# Patient Record
Sex: MALE | Race: ASIAN | NOT HISPANIC OR LATINO | URBAN - METROPOLITAN AREA
[De-identification: names, ages, dates, MRNs, and addresses within clinical notes are randomized per-mention and may not be internally consistent; named-entity substitution may affect disease eponyms.]

---

## 2018-11-25 ENCOUNTER — INPATIENT (INPATIENT)
Facility: HOSPITAL | Age: 33
LOS: 11 days | Discharge: HOME | End: 2018-12-07
Attending: PSYCHIATRY & NEUROLOGY | Admitting: PSYCHIATRY & NEUROLOGY
Payer: COMMERCIAL

## 2018-11-25 ENCOUNTER — EMERGENCY (EMERGENCY)
Facility: HOSPITAL | Age: 33
LOS: 1 days | Discharge: PSYCHIATRIC OR SUBSTANCE USE FACILITY | End: 2018-11-25
Attending: EMERGENCY MEDICINE
Payer: COMMERCIAL

## 2018-11-25 VITALS
OXYGEN SATURATION: 98 % | RESPIRATION RATE: 18 BRPM | TEMPERATURE: 98 F | DIASTOLIC BLOOD PRESSURE: 81 MMHG | HEART RATE: 130 BPM | SYSTOLIC BLOOD PRESSURE: 119 MMHG | WEIGHT: 160.06 LBS

## 2018-11-25 VITALS
SYSTOLIC BLOOD PRESSURE: 133 MMHG | HEART RATE: 101 BPM | RESPIRATION RATE: 16 BRPM | DIASTOLIC BLOOD PRESSURE: 87 MMHG | TEMPERATURE: 99 F | OXYGEN SATURATION: 98 %

## 2018-11-25 DIAGNOSIS — R69 ILLNESS, UNSPECIFIED: ICD-10-CM

## 2018-11-25 DIAGNOSIS — F20.9 SCHIZOPHRENIA, UNSPECIFIED: ICD-10-CM

## 2018-11-25 LAB
ALBUMIN SERPL ELPH-MCNC: 4.9 G/DL — SIGNIFICANT CHANGE UP (ref 3.3–5)
ALP SERPL-CCNC: 60 U/L — SIGNIFICANT CHANGE UP (ref 40–120)
ALT FLD-CCNC: 12 U/L — SIGNIFICANT CHANGE UP (ref 10–45)
ANION GAP SERPL CALC-SCNC: 17 MMOL/L — SIGNIFICANT CHANGE UP (ref 5–17)
APAP SERPL-MCNC: <15 UG/ML — SIGNIFICANT CHANGE UP (ref 10–30)
AST SERPL-CCNC: 17 U/L — SIGNIFICANT CHANGE UP (ref 10–40)
BASOPHILS # BLD AUTO: 0.1 K/UL — SIGNIFICANT CHANGE UP (ref 0–0.2)
BASOPHILS NFR BLD AUTO: 1.3 % — SIGNIFICANT CHANGE UP (ref 0–2)
BILIRUB SERPL-MCNC: 0.5 MG/DL — SIGNIFICANT CHANGE UP (ref 0.2–1.2)
BUN SERPL-MCNC: 10 MG/DL — SIGNIFICANT CHANGE UP (ref 7–23)
CALCIUM SERPL-MCNC: 9.1 MG/DL — SIGNIFICANT CHANGE UP (ref 8.4–10.5)
CHLORIDE SERPL-SCNC: 105 MMOL/L — SIGNIFICANT CHANGE UP (ref 96–108)
CO2 SERPL-SCNC: 20 MMOL/L — LOW (ref 22–31)
CREAT SERPL-MCNC: 0.82 MG/DL — SIGNIFICANT CHANGE UP (ref 0.5–1.3)
EOSINOPHIL # BLD AUTO: 0.4 K/UL — SIGNIFICANT CHANGE UP (ref 0–0.5)
EOSINOPHIL NFR BLD AUTO: 4.7 % — SIGNIFICANT CHANGE UP (ref 0–6)
ETHANOL SERPL-MCNC: 54 MG/DL — HIGH (ref 0–10)
GAS PNL BLDV: SIGNIFICANT CHANGE UP
GLUCOSE SERPL-MCNC: 115 MG/DL — HIGH (ref 70–99)
HCT VFR BLD CALC: 47.7 % — SIGNIFICANT CHANGE UP (ref 39–50)
HGB BLD-MCNC: 17.3 G/DL — HIGH (ref 13–17)
LYMPHOCYTES # BLD AUTO: 2.7 K/UL — SIGNIFICANT CHANGE UP (ref 1–3.3)
LYMPHOCYTES # BLD AUTO: 35 % — SIGNIFICANT CHANGE UP (ref 13–44)
MCHC RBC-ENTMCNC: 31.2 PG — SIGNIFICANT CHANGE UP (ref 27–34)
MCHC RBC-ENTMCNC: 36.3 GM/DL — HIGH (ref 32–36)
MCV RBC AUTO: 85.9 FL — SIGNIFICANT CHANGE UP (ref 80–100)
MONOCYTES # BLD AUTO: 0.6 K/UL — SIGNIFICANT CHANGE UP (ref 0–0.9)
MONOCYTES NFR BLD AUTO: 7.3 % — SIGNIFICANT CHANGE UP (ref 2–14)
NEUTROPHILS # BLD AUTO: 4 K/UL — SIGNIFICANT CHANGE UP (ref 1.8–7.4)
NEUTROPHILS NFR BLD AUTO: 51.7 % — SIGNIFICANT CHANGE UP (ref 43–77)
PLATELET # BLD AUTO: 273 K/UL — SIGNIFICANT CHANGE UP (ref 150–400)
POTASSIUM SERPL-MCNC: 4.3 MMOL/L — SIGNIFICANT CHANGE UP (ref 3.5–5.3)
POTASSIUM SERPL-SCNC: 4.3 MMOL/L — SIGNIFICANT CHANGE UP (ref 3.5–5.3)
PROT SERPL-MCNC: 7.9 G/DL — SIGNIFICANT CHANGE UP (ref 6–8.3)
RBC # BLD: 5.55 M/UL — SIGNIFICANT CHANGE UP (ref 4.2–5.8)
RBC # FLD: 11 % — SIGNIFICANT CHANGE UP (ref 10.3–14.5)
SALICYLATES SERPL-MCNC: <2 MG/DL — LOW (ref 15–30)
SODIUM SERPL-SCNC: 142 MMOL/L — SIGNIFICANT CHANGE UP (ref 135–145)
WBC # BLD: 7.6 K/UL — SIGNIFICANT CHANGE UP (ref 3.8–10.5)
WBC # FLD AUTO: 7.6 K/UL — SIGNIFICANT CHANGE UP (ref 3.8–10.5)

## 2018-11-25 PROCEDURE — 85027 COMPLETE CBC AUTOMATED: CPT

## 2018-11-25 PROCEDURE — 83605 ASSAY OF LACTIC ACID: CPT

## 2018-11-25 PROCEDURE — 99053 MED SERV 10PM-8AM 24 HR FAC: CPT

## 2018-11-25 PROCEDURE — 84132 ASSAY OF SERUM POTASSIUM: CPT

## 2018-11-25 PROCEDURE — 80307 DRUG TEST PRSMV CHEM ANLYZR: CPT

## 2018-11-25 PROCEDURE — 80053 COMPREHEN METABOLIC PANEL: CPT

## 2018-11-25 PROCEDURE — 90792 PSYCH DIAG EVAL W/MED SRVCS: CPT | Mod: GT

## 2018-11-25 PROCEDURE — 82330 ASSAY OF CALCIUM: CPT

## 2018-11-25 PROCEDURE — 99285 EMERGENCY DEPT VISIT HI MDM: CPT | Mod: 25

## 2018-11-25 PROCEDURE — 82435 ASSAY OF BLOOD CHLORIDE: CPT

## 2018-11-25 PROCEDURE — 93005 ELECTROCARDIOGRAM TRACING: CPT

## 2018-11-25 PROCEDURE — 93010 ELECTROCARDIOGRAM REPORT: CPT

## 2018-11-25 PROCEDURE — 84295 ASSAY OF SERUM SODIUM: CPT

## 2018-11-25 PROCEDURE — 70450 CT HEAD/BRAIN W/O DYE: CPT | Mod: 26

## 2018-11-25 PROCEDURE — 82803 BLOOD GASES ANY COMBINATION: CPT

## 2018-11-25 PROCEDURE — 82947 ASSAY GLUCOSE BLOOD QUANT: CPT

## 2018-11-25 PROCEDURE — 70450 CT HEAD/BRAIN W/O DYE: CPT

## 2018-11-25 PROCEDURE — 85014 HEMATOCRIT: CPT

## 2018-11-25 RX ORDER — THIAMINE MONONITRATE (VIT B1) 100 MG
100 TABLET ORAL DAILY
Qty: 0 | Refills: 0 | Status: COMPLETED | OUTPATIENT
Start: 2018-11-25 | End: 2018-11-28

## 2018-11-25 RX ORDER — FOLIC ACID 0.8 MG
1 TABLET ORAL DAILY
Qty: 0 | Refills: 0 | Status: COMPLETED | OUTPATIENT
Start: 2018-11-25 | End: 2018-12-02

## 2018-11-25 NOTE — ED BEHAVIORAL HEALTH ASSESSMENT NOTE - DIFFERENTIAL
unspecified schizophrenia spectrum and other psychotic disorder  r/o schizophrenia, schizoaffective disorder, bipolar disorder, MDD with psychosis, substance/K2 induced psychotic disorder

## 2018-11-25 NOTE — ED PROVIDER NOTE - OBJECTIVE STATEMENT
33 M no known medical problems, brought in by PD for threatening family. Pt moved to [place] from [place] recently, and states he was brought here because he has been in a fight with family and friends from [place] and today he made threatening comments to them. [place] PD called [place] and on arrival patient seemed confused. Patient providing mostly one word answers. Says no when asked if depressed or suicidal. No recent illnesses. Denies toxic ingestion. Denies auditory or visual hallucinations.

## 2018-11-25 NOTE — ED BEHAVIORAL HEALTH ASSESSMENT NOTE - SUBSTANCE ISSUES AND PLAN (INCLUDE STANDING AND PRN MEDICATION)
unclear substance use history, UTox negative with BAL 54, no indication to suspect alcohol withdrawal but should patient show signs including tremor, diaphoresis, nausea/vomiting, tachycardia of unclear origin, would start CIWA protocol

## 2018-11-25 NOTE — ED BEHAVIORAL HEALTH ASSESSMENT NOTE - SUMMARY
33M with unknown past psychiatric history BIB [place] activated by [place] after sister called them to report that patient had been calling her and threatening her.    On exam patient appears to be internally preoccupied, with thought blocking, increased speech latency, and disorganized thought process unable to provide a reliable narrative or explanation for the events leading to his ER presentation but endorsing ongoing homicide ideation towards his sister. Urine toxicology is negative though cannot rule out K2. BAL 52 not likely enough to account for his symptomatology, and so underlying psychotic disorder is most likely diagnosis at this point. Will need to obtain further collateral to clarify history but for now he will need inpatient hospitalization for safety and stabilization and meets [facility] criteria based on his being a threat to others.

## 2018-11-25 NOTE — ED PROVIDER NOTE - ATTENDING CONTRIBUTION TO CARE
Attending MD Durham:  I personally have seen and examined this patient.  Resident note reviewed and agree on plan of care and except where noted.  See HPI, PE, and MDM for details.      33M with unknown pmh presenting with report of threatening text messages to family, confusion. Patient on exam is very withdrawn but oriented, nonfocal neuro exam, vitals notable for tachycardia but no other exam findings suggestive of sympathomimetic toxidrome. Ddx includes primary psychiatric disturbance, intoxication. Do not suspect meningoencephalitis. Plan for labs, CT head, attempt to contact family to obtain collateral information regarding medical or psychiatric history

## 2018-11-25 NOTE — ED ADULT NURSE NOTE - OBJECTIVE STATEMENT
32 y/o m bib ems/police after he threatened his family in [place], [place] PD called [place] PD and went to his apt. brought to ED for AMS, on assessment, he responded in 1-2 words, (yes, no, not sure). pt. denies any s/hi, etoh/drug hx, not on any current treatment

## 2018-11-25 NOTE — ED BEHAVIORAL HEALTH ASSESSMENT NOTE - DESCRIPTION
Per chart review, ED staff:  Patient brought in via EMS at 4:03, telepsychiatry consulted at 5:11. Patient was minimally cooperative with ED treatment. Patient changed into hospital gown and provided blood sample without incident. Primary nurse reported patient is quiet and appears internally preoccupied. Patient continues to have poor eye contact in ED. Patient denied substance use but had a positive alcohol result. Primary nurse described patient as depressed with constricted affect; noting patient is evasive. When questioned about symptoms and reason for being in the ED patient responds with one- two word answers. Patient denies substance use. Patient denied history of past psych. At 4:59 patient was compliant with head CT and was transported without incident at Patient has been in good behavioral control in ED. Patient has not required medication or security intervention. Patient has not yet provided urine sample, stating that he does not have to go to the bathroom Denies, unable to accurately assess as patient is unreliable historian Reports that he was born and raised in [place], lived with biological parents, graduated HS in 4 years, went to college, graduated in 2009, worked at [employer] until 2013, unemployed since then but states that he is enrolled at [employer] in masters of public health

## 2018-11-25 NOTE — ED PROVIDER NOTE - PROGRESS NOTE DETAILS
Attending MD Durham: Shira Aly  (sister) Attending MD Durham: voicemail left for Shira Mcgovern seen by psych, recommend inpatient psych

## 2018-11-25 NOTE — ED PROVIDER NOTE - PHYSICAL EXAMINATION
Attending MD Durham: A & O x 2, withdrawn, rarely makes eye contact, calm, follows commands, EOMI b/l, PERRL b/l; lungs CTAB, heart tachy but reg rhythm without murmur; abdomen soft NTND; extremities warm with no edema; affect appropriate. neuro exam non focal with no gross motor or sensory deficits. skin dry

## 2018-11-25 NOTE — ED BEHAVIORAL HEALTH ASSESSMENT NOTE - HPI (INCLUDE ILLNESS QUALITY, SEVERITY, DURATION, TIMING, CONTEXT, MODIFYING FACTORS, ASSOCIATED SIGNS AND SYMPTOMS)
*history limited by patient mental status as he was internally preoccupied and often answered questions with "I don't know" or "maybe"    33M, new to our system, with unknown past psychiatric and medical history now brought in by [facility] called by [place] pd after sister called them and reported that he was threatening them. Sister ([phone]) and parents ([phone] or [phone]) did not answer calls and messages were left requesting that they call back.     Patient states that he moved from [place] where he grew up to [facility] to an apartment that he lives in by himself, supporting himself through savings he made while working for [employer], though he has not worked since 2013. He states that since that time, he spends his days in his apartment, sometimes reading, unable to state what he does every day, though he reports sleeping from 1am to 11am every night. He at first stated that he was in the hospital because his sister was harassing him by calling him names. Later he acknowledged that he has had thoughts of hurting her and that he threatened her, saying that he was half joking but half wanted to. He said he would not do anything to her because she was in [place] and he was here, but that he would not do anything to her if she were here because he felt in control now. He states that he wants to hurt her and others from college because they make fun of him and harass him though he did not clarify. Denies  having any firearms, denies auditory or visual hallucinations, denies that anyone is following him or out to get him. Denies having depression but stated "I don't know" when asked if he had been diagnosed with bipolar, schizophrenia, schizoaffective disorder. He denied taking any medications currently, denied taking supplements, denied using drugs but states he smokes cigarettes sometimes and that he had 3-4 beers on thanksgiving. Inconsistent history with regards to thanksgiving event as he first referenced that he was having trouble with relatives staying with him then later denied that he had any relatives staying with him.

## 2018-11-25 NOTE — ED ADULT NURSE NOTE - NSIMPLEMENTINTERV_GEN_ALL_ED
Implemented All Universal Safety Interventions:  [place] to call system. Call bell, personal items and telephone within reach. Instruct patient to call for assistance. Room bathroom lighting operational. Non-slip footwear when patient is off stretcher. Physically safe environment: no spills, clutter or unnecessary equipment. Stretcher in lowest position, wheels locked, appropriate side rails in place.

## 2018-11-25 NOTE — ED BEHAVIORAL HEALTH ASSESSMENT NOTE - OTHER PAST PSYCHIATRIC HISTORY (INCLUDE DETAILS REGARDING ONSET, COURSE OF ILLNESS, INPATIENT/OUTPATIENT TREATMENT)
States that he previously saw a psychiatrist in order to stay in school but was not able to clarify and stated "I don't know" in response to questions about what medication he has taken in the past and what his diagnosis is

## 2018-11-25 NOTE — ED ADULT NURSE NOTE - HPI (INCLUDE ILLNESS QUALITY, SEVERITY, DURATION, TIMING, CONTEXT, MODIFYING FACTORS, ASSOCIATED SIGNS AND SYMPTOMS)
34 y/o male bib ems/police after he threatened his family in [place]. on assessment, pt. is quiet, appears internally preoccupied, but cooperative w/ care and procedure. pt. responded in 1 or 2 words only. has poor eye contact, mood suspicious, affect constricted. pt. denies any etoh/drug abuse, not an any psych or ant-depressants.

## 2018-11-25 NOTE — ED BEHAVIORAL HEALTH ASSESSMENT NOTE - PSYCHIATRIC ISSUES AND PLAN (INCLUDE STANDING AND PRN MEDICATION)
obtain collateral from sister and parents. Patient has remained calm and in good behavioral control thus far. Would recommend starting risperdal 1mg PO BID for now until collateral obtained. For agitation, haldol 5mg with ativan 2mg and benadryl 50mg PO or IM if acute

## 2018-11-25 NOTE — ED PROVIDER NOTE - MEDICAL DECISION MAKING DETAILS
33 M brought in by PD for threatening behavior, and confusion. Patient aox3, with no neuro deficits, but giving one word answers, flat affect and avoiding eye contact. Tachycardic but afebrile. Trace monitor vitals, psych labs, ct head, psych consult

## 2018-11-25 NOTE — ED BEHAVIORAL HEALTH ASSESSMENT NOTE - RISK ASSESSMENT
risk factors include current homicide ideation, current psychosis, alcohol use. Unable to obtain an accurate history that would be needed to make a more accurate risk assessment but as of now he lacks protective factors.

## 2018-11-25 NOTE — ED BEHAVIORAL HEALTH ASSESSMENT NOTE - DESCRIPTION (FIRST USE, LAST USE, QUANTITY, FREQUENCY, DURATION)
unknown use frequency/quantity unclear duration and quantity. States that he drinks sometimes, said 3-4 beers on anna, BAL 54 at 4:49am

## 2018-11-25 NOTE — ED BEHAVIORAL HEALTH ASSESSMENT NOTE - ADDITIONAL DETAILS / COMMENTS
No insight into his illness, answers "I don't know" to many basic historical questions, wanted discharge to go home

## 2018-11-25 NOTE — ED BEHAVIORAL HEALTH ASSESSMENT NOTE - OTHER
unreliable historian, appears internally preoccupied unreliable historian, unable to accurately assess family in [place] called police answered questions with brief answers, often looked down or around the room did not assess gait/station flat denied but appeared internally preoccupied unable to assess, claims to be unemployed

## 2018-11-26 VITALS — TEMPERATURE: 98 F

## 2018-11-26 LAB
CHOLEST SERPL-MCNC: 158 MG/DL — SIGNIFICANT CHANGE UP (ref 120–199)
HBA1C BLD-MCNC: 4.8 % — SIGNIFICANT CHANGE UP (ref 4–5.6)
HDLC SERPL-MCNC: 67 MG/DL — HIGH (ref 35–55)
LIPID PNL WITH DIRECT LDL SERPL: 87 MG/DL — SIGNIFICANT CHANGE UP
TRIGL SERPL-MCNC: 101 MG/DL — SIGNIFICANT CHANGE UP (ref 10–149)
TSH SERPL-MCNC: 0.33 UIU/ML — SIGNIFICANT CHANGE UP (ref 0.27–4.2)

## 2018-11-26 PROCEDURE — 99222 1ST HOSP IP/OBS MODERATE 55: CPT

## 2018-11-26 RX ORDER — RISPERIDONE 4 MG/1
0.5 TABLET ORAL AT BEDTIME
Qty: 0 | Refills: 0 | Status: DISCONTINUED | OUTPATIENT
Start: 2018-11-26 | End: 2018-11-27

## 2018-11-26 RX ADMIN — Medication 1 MILLIGRAM(S): at 09:54

## 2018-11-26 RX ADMIN — Medication 100 MILLIGRAM(S): at 09:54

## 2018-11-26 RX ADMIN — RISPERIDONE 0.5 MILLIGRAM(S): 4 TABLET ORAL at 20:39

## 2018-11-26 RX ADMIN — Medication 1 TABLET(S): at 09:54

## 2018-11-26 NOTE — CHART NOTE - NSCHARTNOTEFT_GEN_A_CORE
Screening Medical Evaluation  Patient Admitted from: [facility] ED    [facility] admitting diagnosis: Schizophrenia    PAST MEDICAL & SURGICAL HISTORY:        Allergies    No Known Allergies    Intolerances        Social History:     FAMILY HISTORY:      MEDICATIONS  (STANDING):  folic acid 1 milliGRAM(s) Oral daily  multivitamin 1 Tablet(s) Oral daily  thiamine 100 milliGRAM(s) Oral daily    MEDICATIONS  (PRN):  LORazepam     Tablet 2 milliGRAM(s) Oral every 6 hours PRN Agitation  LORazepam     Tablet 2 milliGRAM(s) Oral every 2 hours PRN CIWA score increase by 2 points and current CIWA score GREATER THAN 9  LORazepam   Injectable 2 milliGRAM(s) IntraMuscular every 2 hours PRN CIWA score increase by 2 points and current CIWA score GREATER THAN 9  LORazepam   Injectable 2 milliGRAM(s) IntraMuscular once PRN Agitation      Vital Signs Last 24 Hrs  T(C): --  T(F): --  HR: --  BP: --  BP(mean): --  RR: --  SpO2: --  CAPILLARY BLOOD GLUCOSE            PHYSICAL EXAM:  GENERAL: NAD, well-developed  HEAD:  Atraumatic, Normocephalic  EYES: EOMI, PERRLA, conjunctiva and sclera clear  NECK: Supple.  CHEST/LUNG: Clear to auscultation bilaterally; No wheeze  HEART: Regular rate and rhythm; +s1 and +s2; No murmurs, rubs, or gallops  ABDOMEN: Soft, Nontender, Nondistended; Normoactive Bowel sounds present  EXTREMITIES:  2+ Peripheral Pulses, No cyanosis, or edema  PSYCH: AAOx3  NEUROLOGY: non-focal  SKIN: No rashes or lesions    LABS:                    RADIOLOGY & ADDITIONAL TESTS:    Assessment and Plan:  33 year old male presenting today from [facility] ED to [facility] with admitting diagnosis of Schizophrenia with no pertinent PMH. Denies any medical concerns at this time. Denies any fever, chills, headache, chest pain, SOB, abdominal pain, N/V/D/C. CIWA as a precaution due to elevated BAL on presentiation to [facility]. EKG Normal sinus rhytm, CBC, CMP WNL. UA, TSH, lipid priofile, and HbgA1c to follow up in AM.  1) Schizophrenia: Follow care plan as per primary psych team.

## 2018-11-27 PROCEDURE — 99232 SBSQ HOSP IP/OBS MODERATE 35: CPT

## 2018-11-27 RX ORDER — RISPERIDONE 4 MG/1
1 TABLET ORAL AT BEDTIME
Qty: 0 | Refills: 0 | Status: DISCONTINUED | OUTPATIENT
Start: 2018-11-27 | End: 2018-11-29

## 2018-11-27 RX ADMIN — RISPERIDONE 1 MILLIGRAM(S): 4 TABLET ORAL at 21:15

## 2018-11-27 RX ADMIN — Medication 1 TABLET(S): at 09:06

## 2018-11-27 RX ADMIN — Medication 1 MILLIGRAM(S): at 09:06

## 2018-11-27 RX ADMIN — Medication 100 MILLIGRAM(S): at 09:06

## 2018-11-28 PROCEDURE — 99232 SBSQ HOSP IP/OBS MODERATE 35: CPT

## 2018-11-28 RX ADMIN — Medication 100 MILLIGRAM(S): at 08:03

## 2018-11-28 RX ADMIN — Medication 1 TABLET(S): at 08:03

## 2018-11-28 RX ADMIN — Medication 1 MILLIGRAM(S): at 08:03

## 2018-11-28 RX ADMIN — RISPERIDONE 1 MILLIGRAM(S): 4 TABLET ORAL at 22:07

## 2018-11-29 PROCEDURE — 99232 SBSQ HOSP IP/OBS MODERATE 35: CPT

## 2018-11-29 RX ORDER — RISPERIDONE 4 MG/1
1.5 TABLET ORAL AT BEDTIME
Qty: 0 | Refills: 0 | Status: DISCONTINUED | OUTPATIENT
Start: 2018-11-29 | End: 2018-11-30

## 2018-11-29 RX ADMIN — Medication 1 TABLET(S): at 08:41

## 2018-11-29 RX ADMIN — RISPERIDONE 1.5 MILLIGRAM(S): 4 TABLET ORAL at 20:24

## 2018-11-29 RX ADMIN — Medication 1 MILLIGRAM(S): at 08:41

## 2018-11-30 LAB
T PALLIDUM AB TITR SER: NEGATIVE — SIGNIFICANT CHANGE UP
VIT B12 SERPL-MCNC: 583 PG/ML — SIGNIFICANT CHANGE UP (ref 200–900)

## 2018-11-30 PROCEDURE — 99232 SBSQ HOSP IP/OBS MODERATE 35: CPT

## 2018-11-30 RX ORDER — RISPERIDONE 4 MG/1
2 TABLET ORAL AT BEDTIME
Qty: 0 | Refills: 0 | Status: DISCONTINUED | OUTPATIENT
Start: 2018-11-30 | End: 2018-12-03

## 2018-11-30 RX ADMIN — Medication 1 MILLIGRAM(S): at 09:02

## 2018-11-30 RX ADMIN — RISPERIDONE 2 MILLIGRAM(S): 4 TABLET ORAL at 20:48

## 2018-11-30 RX ADMIN — Medication 1 TABLET(S): at 09:02

## 2018-12-01 RX ADMIN — Medication 1 MILLIGRAM(S): at 09:21

## 2018-12-01 RX ADMIN — RISPERIDONE 2 MILLIGRAM(S): 4 TABLET ORAL at 20:06

## 2018-12-01 RX ADMIN — Medication 1 TABLET(S): at 09:21

## 2018-12-02 RX ADMIN — Medication 1 MILLIGRAM(S): at 10:36

## 2018-12-02 RX ADMIN — RISPERIDONE 2 MILLIGRAM(S): 4 TABLET ORAL at 20:23

## 2018-12-02 RX ADMIN — Medication 1 TABLET(S): at 10:36

## 2018-12-03 PROCEDURE — 99232 SBSQ HOSP IP/OBS MODERATE 35: CPT

## 2018-12-03 RX ORDER — BENZTROPINE MESYLATE 1 MG
1 TABLET ORAL EVERY 6 HOURS
Qty: 0 | Refills: 0 | Status: DISCONTINUED | OUTPATIENT
Start: 2018-12-03 | End: 2018-12-07

## 2018-12-03 RX ORDER — BENZTROPINE MESYLATE 1 MG
1 TABLET ORAL AT BEDTIME
Qty: 0 | Refills: 0 | Status: DISCONTINUED | OUTPATIENT
Start: 2018-12-03 | End: 2018-12-03

## 2018-12-03 RX ORDER — RISPERIDONE 4 MG/1
2.5 TABLET ORAL AT BEDTIME
Qty: 0 | Refills: 0 | Status: DISCONTINUED | OUTPATIENT
Start: 2018-12-03 | End: 2018-12-04

## 2018-12-03 RX ORDER — RISPERIDONE 4 MG/1
3 TABLET ORAL AT BEDTIME
Qty: 0 | Refills: 0 | Status: DISCONTINUED | OUTPATIENT
Start: 2018-12-03 | End: 2018-12-03

## 2018-12-03 RX ADMIN — RISPERIDONE 2.5 MILLIGRAM(S): 4 TABLET ORAL at 21:08

## 2018-12-04 PROCEDURE — 99232 SBSQ HOSP IP/OBS MODERATE 35: CPT

## 2018-12-04 RX ORDER — RISPERIDONE 4 MG/1
3 TABLET ORAL AT BEDTIME
Qty: 0 | Refills: 0 | Status: DISCONTINUED | OUTPATIENT
Start: 2018-12-04 | End: 2018-12-06

## 2018-12-04 RX ADMIN — RISPERIDONE 3 MILLIGRAM(S): 4 TABLET ORAL at 20:56

## 2018-12-05 PROCEDURE — 99232 SBSQ HOSP IP/OBS MODERATE 35: CPT

## 2018-12-05 RX ADMIN — RISPERIDONE 3 MILLIGRAM(S): 4 TABLET ORAL at 20:31

## 2018-12-06 PROCEDURE — 99232 SBSQ HOSP IP/OBS MODERATE 35: CPT

## 2018-12-06 RX ORDER — RISPERIDONE 4 MG/1
1 TABLET ORAL
Qty: 2 | Refills: 0 | OUTPATIENT
Start: 2018-12-06 | End: 2018-12-07

## 2018-12-06 RX ORDER — BENZTROPINE MESYLATE 1 MG
1 TABLET ORAL
Qty: 60 | Refills: 0 | OUTPATIENT
Start: 2018-12-06 | End: 2018-12-20

## 2018-12-06 RX ORDER — PALIPERIDONE 1.5 MG/1
234 TABLET, EXTENDED RELEASE ORAL ONCE
Qty: 0 | Refills: 0 | Status: COMPLETED | OUTPATIENT
Start: 2018-12-07 | End: 2018-12-07

## 2018-12-06 RX ORDER — PALIPERIDONE 1.5 MG/1
156 TABLET, EXTENDED RELEASE ORAL
Qty: 156 | Refills: 0 | OUTPATIENT
Start: 2018-12-06 | End: 2019-01-02

## 2018-12-06 RX ORDER — RISPERIDONE 4 MG/1
4 TABLET ORAL AT BEDTIME
Qty: 0 | Refills: 0 | Status: DISCONTINUED | OUTPATIENT
Start: 2018-12-06 | End: 2018-12-07

## 2018-12-06 RX ADMIN — RISPERIDONE 4 MILLIGRAM(S): 4 TABLET ORAL at 21:03

## 2018-12-07 VITALS — TEMPERATURE: 98 F

## 2018-12-07 LAB — HEAVY MET PNL SERPL: SIGNIFICANT CHANGE UP

## 2018-12-07 PROCEDURE — 99238 HOSP IP/OBS DSCHRG MGMT 30/<: CPT

## 2018-12-07 RX ADMIN — PALIPERIDONE 234 MILLIGRAM(S): 1.5 TABLET, EXTENDED RELEASE ORAL at 09:37

## 2018-12-10 ENCOUNTER — OUTPATIENT (OUTPATIENT)
Dept: OUTPATIENT SERVICES | Facility: HOSPITAL | Age: 33
LOS: 1 days | Discharge: HOME | End: 2018-12-10

## 2018-12-11 DIAGNOSIS — F12.10 CANNABIS ABUSE, UNCOMPLICATED: ICD-10-CM

## 2018-12-11 DIAGNOSIS — F29 UNSPECIFIED PSYCHOSIS NOT DUE TO A SUBSTANCE OR KNOWN PHYSIOLOGICAL CONDITION: ICD-10-CM

## 2018-12-28 RX ORDER — RISPERIDONE 4 MG/1
1 TABLET ORAL
Qty: 2 | Refills: 0 | OUTPATIENT
Start: 2018-12-28 | End: 2018-12-29
